# Patient Record
Sex: MALE | Race: WHITE
[De-identification: names, ages, dates, MRNs, and addresses within clinical notes are randomized per-mention and may not be internally consistent; named-entity substitution may affect disease eponyms.]

---

## 2020-03-10 ENCOUNTER — HOSPITAL ENCOUNTER (EMERGENCY)
Dept: HOSPITAL 74 - JERFT | Age: 30
Discharge: HOME | End: 2020-03-10
Payer: COMMERCIAL

## 2020-03-10 VITALS — HEART RATE: 103 BPM | SYSTOLIC BLOOD PRESSURE: 158 MMHG | TEMPERATURE: 98.7 F | DIASTOLIC BLOOD PRESSURE: 79 MMHG

## 2020-03-10 VITALS — BODY MASS INDEX: 24.3 KG/M2

## 2020-03-10 DIAGNOSIS — J11.1: Primary | ICD-10-CM

## 2020-03-10 NOTE — PDOC
Rapid Medical Evaluation


Time Seen by Provider: 03/10/20 21:42


Medical Evaluation: 





03/10/20 21:51


I have performed a brief in-person evaluation of this patient.





The patient presents with a chief complaint of: Pt is 28 y/o male who presents 

with bodyaches since yesterday. Slight cough.  No fevers. No sick contacts.  





Pertinent physical exam findings:stable, no respiratory distress





I have ordered the following: none 





The patient will proceed to the ED for further evaluation





**Discharge Disposition





- Diagnosis


 Cough








- Referrals





- Patient Instructions





- Post Discharge Activity

## 2020-03-10 NOTE — PDOC
History of Present Illness





- General


Chief Complaint: Cold Symptoms


Stated Complaint: CONGESTION/BODYACHES


Time Seen by Provider: 03/10/20 21:42





- History of Present Illness


Initial Comments: 





03/10/20 22:28


29-year-old male with flulike symptoms x1 day no comorbidities





Past History





- Past Medical History


Allergies/Adverse Reactions: 


                                    Allergies











Allergy/AdvReac Type Severity Reaction Status Date / Time


 


No Known Allergies Allergy   Verified 03/10/20 21:53











Home Medications: 


Ambulatory Orders





Oseltamivir Phosphate [Tamiflu] 75 mg PO BID #10 capsule 03/10/20 








CVA: No


COPD: No





- Psycho Social/Smoking Cessation Hx


Smoking History: Never smoked


Have you smoked in the past 12 months: No


Information on smoking cessation initiated: No


Hx Alcohol Use: No


Drug/Substance Use Hx: No





**Review of Systems





- Review of Systems


Constitutional: Yes: Chills, Fever, Malaise, Night Sweats


HEENTM: Yes: Nose Congestion


Respiratory: Yes: Cough





*Physical Exam





- Vital Signs


                                Last Vital Signs











Temp Pulse Resp BP Pulse Ox


 


 98.7 F   103 H  18   158/79   100 


 


 03/10/20 21:51  03/10/20 21:51  03/10/20 21:51  03/10/20 21:51  03/10/20 21:51














- Physical Exam





03/10/20 22:28


GENERAL: The patient is awake, alert, and fully oriented, in no acute distress.


HEAD: Normal with no signs of trauma.


EYES:  sclera anicteric, conjunctiva clear.


ENT: Ears normal tympanic membranes normal oropharynx clear uvula midline


NECK: Normal range of motion


LUNGS: Breath sounds equal, clear to auscultation bilaterally.  No wheezes, and 

no crackles.


HEART: S1 and S2 without murmur, rub or gallop.


ABDOMEN: Soft, nontender, normoactive bowel sounds.  No guarding, no rebound.  

No masses.


EXTREMITIES: Normal range of motion, no edema.  No clubbing or cyanosis. No 

cords, erythema, or tenderness.


NEUROLOGICAL: Cranial nerves II through XII grossly intact.


PSYCH: Normal mood, normal affect.


SKIN: Warm, Dry, normal turgor, no rashes or lesions noted.





Medical Decision Making





- Medical Decision Making





03/10/20 22:28


We will treat for influenza based on symptoms and timeframe





I have reviewed the pathophysiology with the patient.  They are in agreement 

with the treatment plan all questions were answered to their satisfaction.  

Understanding for follow-up without fail was also conveyed to the patient.  

Again they are in agreement.





Discharge





- Discharge Information


Problems reviewed: Yes


Clinical Impression/Diagnosis: 


 Cough, Influenza





Condition: Stable


Disposition: HOME





- Admission


No





- Additional Discharge Information


Prescriptions: 


Oseltamivir Phosphate [Tamiflu] 75 mg PO BID #10 capsule





- Follow up/Referral


Referrals: 


Margie Whalen MD [Staff Physician] - 





- Patient Discharge Instructions


Additional Instructions: 


Tylenol Motrin as directed for fever and body aches.  Return to the emergency 

room for worsening symptoms and without fail follow-up with your primary care 

physician in 1 to 2 days for further evaluation and treatment options.  Please 

take the Tamiflu as directed.





- Post Discharge Activity


Work/Back to School Note:  Back to Work